# Patient Record
Sex: MALE | Race: WHITE | NOT HISPANIC OR LATINO | Employment: FULL TIME | URBAN - METROPOLITAN AREA
[De-identification: names, ages, dates, MRNs, and addresses within clinical notes are randomized per-mention and may not be internally consistent; named-entity substitution may affect disease eponyms.]

---

## 2021-01-18 ENCOUNTER — OFFICE VISIT (OUTPATIENT)
Dept: SURGERY | Facility: CLINIC | Age: 21
End: 2021-01-18

## 2021-01-18 VITALS
BODY MASS INDEX: 22.43 KG/M2 | SYSTOLIC BLOOD PRESSURE: 118 MMHG | DIASTOLIC BLOOD PRESSURE: 70 MMHG | WEIGHT: 169.2 LBS | HEIGHT: 73 IN

## 2021-01-18 DIAGNOSIS — L72.3 SEBACEOUS CYST: Primary | ICD-10-CM

## 2021-01-18 PROCEDURE — 99202 OFFICE O/P NEW SF 15 MIN: CPT | Performed by: SURGERY

## 2021-01-18 NOTE — PROGRESS NOTES
"    PATIENT INFORMATION  Hemant Chopra       - 2000    CHIEF COMPLAINT  Chief Complaint   Patient presents with   • Cyst     Lt buttocks        HISTORY OF PRESENT ILLNESS  HPI complains of a cyst on his left buttock that has been increasing over the last year.  He denies any infection at the site any drainage from the site he is never been on any antibiotics.  He says it is now uncomfortable when he sits on it.        REVIEW OF SYSTEMS  Review of Systems      ACTIVE PROBLEMS  There are no active problems to display for this patient.        PAST MEDICAL HISTORY  History reviewed. No pertinent past medical history.      SURGICAL HISTORY  Past Surgical History:   Procedure Laterality Date   • CYST REMOVAL Right     behind rt ear   • WISDOM TOOTH EXTRACTION           FAMILY HISTORY  Family History   Problem Relation Age of Onset   • No Known Problems Mother    • No Known Problems Father          SOCIAL HISTORY  Social History     Occupational History   • Not on file   Tobacco Use   • Smoking status: Never Smoker   • Smokeless tobacco: Never Used   Substance and Sexual Activity   • Alcohol use: Yes     Comment: 3-4 beer per week   • Drug use: Not Currently     Types: Marijuana   • Sexual activity: Defer         CURRENT MEDICATIONS  No current outpatient medications on file.    ALLERGIES  Patient has no known allergies.    VITALS  Vitals:    21 1400   BP: 118/70   BP Location: Right arm   Patient Position: Sitting   Cuff Size: Adult   Weight: 76.7 kg (169 lb 3.2 oz)   Height: 185.4 cm (73\")       LAST RESULTS   No results found for any previous visit.     No results found.    PHYSICAL EXAM  Debilities/Disabilities Identified: None  Emotional Behavior: Appropriate  Physical Exam  Alert white male in no active distress he has a 1.1 cm non infected sebaceous cyst on his left buttock.  This is about 2-1/2 cm from his anal verge at the 11 o'clock position.  I do not think this is a fistula and " anal.  ASSESSMENT    Sebaceous cyst    PLAN  The risk benefits and options were discussed with the patient in detail.  We will proceed with office surgery excision of this at his convenience

## 2021-02-02 ENCOUNTER — PROCEDURE VISIT (OUTPATIENT)
Dept: SURGERY | Facility: CLINIC | Age: 21
End: 2021-02-02

## 2021-02-02 VITALS — BODY MASS INDEX: 21.87 KG/M2 | WEIGHT: 165 LBS | HEIGHT: 73 IN

## 2021-02-02 DIAGNOSIS — L72.3 SEBACEOUS CYST: Primary | ICD-10-CM

## 2021-02-02 PROCEDURE — 11402 EXC TR-EXT B9+MARG 1.1-2 CM: CPT | Performed by: SURGERY

## 2021-02-02 NOTE — PROGRESS NOTES
Exc. Buttock cyst history right 1st have somewhat improved but is still present.  The swelling is less.  He denies any allergy to local anesthetics.  The site was prepped draped locally infiltrated with 1% lidocaine without epinephrine.  A total of 4 cc was used.  Transverse elliptical skin incision was made through the skin and subcutaneous tissue.  All chronic granulation tissue was excised.  There was no evidence of fistula in ano.  Specimen was passed off it was 1.2 cm.  Skin was closed in interrupted simple and vertical mattress fashion with use of 4-0 nylon.  Wound was cleaned and dried and sterilely dressed.  Wound care was discussed.  We will see him back in 7 to 8 days.

## 2021-02-04 LAB
DX ICD CODE: NORMAL
PATH REPORT.FINAL DX SPEC: NORMAL
PATH REPORT.GROSS SPEC: NORMAL
PATH REPORT.RELEVANT HX SPEC: NORMAL
PATH REPORT.SITE OF ORIGIN SPEC: NORMAL
PATHOLOGIST NAME: NORMAL
PAYMENT PROCEDURE: NORMAL

## 2021-02-10 ENCOUNTER — OFFICE VISIT (OUTPATIENT)
Dept: SURGERY | Facility: CLINIC | Age: 21
End: 2021-02-10

## 2021-02-10 DIAGNOSIS — Z09 SURGICAL FOLLOW-UP CARE: Primary | ICD-10-CM

## 2021-02-10 PROCEDURE — 99024 POSTOP FOLLOW-UP VISIT: CPT | Performed by: SURGERY

## 2021-02-10 NOTE — PROGRESS NOTES
Patient presents for a 8 day po on Sebaceous cyst I & D. Patient states that he feels well and is ready to have the stitches taken out, he has no complaints at this time.  He is without complaints.  His pathology was discussed.  Incision is healing well sutures were removed wound care was discussed I will see him back as needed